# Patient Record
Sex: FEMALE | Race: WHITE | Employment: PART TIME | URBAN - METROPOLITAN AREA
[De-identification: names, ages, dates, MRNs, and addresses within clinical notes are randomized per-mention and may not be internally consistent; named-entity substitution may affect disease eponyms.]

---
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
## 2019-03-14 ENCOUNTER — HOSPITAL ENCOUNTER (EMERGENCY)
Facility: CLINIC | Age: 19
Discharge: HOME | End: 2019-03-14
Attending: PHYSICIAN ASSISTANT | Admitting: PHYSICIAN ASSISTANT
Payer: COMMERCIAL

## 2019-03-14 ENCOUNTER — APPOINTMENT (OUTPATIENT)
Dept: GENERAL RADIOLOGY | Facility: CLINIC | Age: 19
End: 2019-03-14
Attending: PHYSICIAN ASSISTANT
Payer: COMMERCIAL

## 2019-03-14 VITALS
SYSTOLIC BLOOD PRESSURE: 139 MMHG | TEMPERATURE: 98.3 F | HEART RATE: 99 BPM | RESPIRATION RATE: 16 BRPM | OXYGEN SATURATION: 99 % | DIASTOLIC BLOOD PRESSURE: 81 MMHG

## 2019-03-14 DIAGNOSIS — S62.662B OPEN NONDISPLACED FRACTURE OF DISTAL PHALANX OF RIGHT MIDDLE FINGER, INITIAL ENCOUNTER: ICD-10-CM

## 2019-03-14 DIAGNOSIS — S60.10XA SUBUNGUAL HEMATOMA OF DIGIT OF HAND, INITIAL ENCOUNTER: ICD-10-CM

## 2019-03-14 PROCEDURE — 26750 TREAT FINGER FRACTURE EACH: CPT

## 2019-03-14 PROCEDURE — G0463 HOSPITAL OUTPT CLINIC VISIT: HCPCS | Mod: 25

## 2019-03-14 PROCEDURE — 99213 OFFICE O/P EST LOW 20 MIN: CPT | Mod: 25 | Performed by: PHYSICIAN ASSISTANT

## 2019-03-14 PROCEDURE — 73140 X-RAY EXAM OF FINGER(S): CPT | Mod: RT

## 2019-03-14 PROCEDURE — 26750 TREAT FINGER FRACTURE EACH: CPT | Mod: F7 | Performed by: PHYSICIAN ASSISTANT

## 2019-03-14 RX ORDER — CEPHALEXIN 500 MG/1
500 CAPSULE ORAL 4 TIMES DAILY
Qty: 28 CAPSULE | Refills: 0 | Status: SHIPPED | OUTPATIENT
Start: 2019-03-14 | End: 2019-03-19

## 2019-03-14 NOTE — ED AVS SNAPSHOT
[facility] Emergency Department  [address] [facility] [address]  Phone:  [phone]  Fax:  [phone]                                    Idalia Alegria   MRN: [id number]    Department:  [facility] Emergency Department   Date of Visit:  3/14/2019           After Visit Summary Signature Page    I have received my discharge instructions, and my questions have been answered. I have discussed any challenges I see with this plan with the nurse or doctor.    ..........................................................................................................................................  Patient/Patient Representative Signature      ..........................................................................................................................................  Patient Representative Print Name and Relationship to Patient    ..................................................               ................................................  Date                                   Time    ..........................................................................................................................................  Reviewed by Signature/Title    ...................................................              ..............................................  Date                                               Time          22EPIC Rev 08/18

## 2019-03-14 NOTE — ED PROVIDER NOTES
History     Chief Complaint   Patient presents with     Hand Pain     smashed finger in car door on Monday     HPI  Idalia Alegria is a 18 year old female who presents to the urgent care with concern over right long finger pain after injury 3 days prior to arrival when she closed it in a car door.  She had immediate onset of ecchymosis, swelling.  She complains of mild to moderate pain which is exacerbated by palpation.  She has also noted decreased range of motion due to swelling.  No significant distal numbness or paresthesias.  She has not attempted any OTC treatments.  She is unsure the date of her last tetanus vaccine however epic records indicate she has received within the last ten years.      Allergies:  No Known Allergies    Problem List:    Patient Active Problem List    Diagnosis Date Noted     Pain of finger of right hand 08/14/2015     Priority: Medium     Acne vulgaris 08/14/2015     Priority: Medium     Periods, menstrual, difficult 08/14/2015     Priority: Medium        Past Medical History:    Past Medical History:   Diagnosis Date     Pneumonia        Past Surgical History:    No past surgical history on file.    Family History:    Family History   Problem Relation Age of Onset     C.A.D. No family hx of      Breast Cancer No family hx of      Cancer - colorectal No family hx of        Social History:  Marital Status:  Single [1]  Social History     Tobacco Use     Smoking status: Never Smoker     Smokeless tobacco: Never Used     Tobacco comment: no exposure   Substance Use Topics     Alcohol use: Yes     Comment: rarely     Drug use: No      Medications:      LESSINA-28 0.1-20 MG-MCG per tablet   venlafaxine (EFFEXOR-XR) 37.5 MG 24 hr capsule     Review of Systems  INTEGUMENTARY/SKIN: POSITIVE for ecchymosis, abrasion NEGATIVE for lacerations, worrisome rashes   MUSCULOSKELETAL: POSITIVE  for right long finger pain and swelling and NEGATIVE for other concerning arthralgias or myalgias   NEURO:  NEGATIVE for numbness, paresthesias   Physical Exam   BP: 139/81  Pulse: 99  Temp: 98.3  F (36.8  C)  Resp: 16  SpO2: 99 %  Physical Exam   Constitutional: She is oriented to person, place, and time. She appears well-developed and well-nourished. No distress.   Cardiovascular:   Pulses:       Radial pulses are 2+ on the right side.   Musculoskeletal:        Right wrist: Normal.        Right hand: She exhibits decreased range of motion (dip joint of long finger due to pain and swelling), tenderness and swelling. She exhibits normal capillary refill, no deformity and no laceration.   Large subungual hematoma that encompasses entire area of right long fingernail   Neurological: She is alert and oriented to person, place, and time. No sensory deficit.   Skin: Skin is warm and dry. Ecchymosis noted. No abrasion, no laceration and no rash noted. No erythema.     ED Course        Procedures        Critical Care time:  none          Results for orders placed or performed during the hospital encounter of 03/14/19   XR Finger Right G/E 2 Views    Narrative    RIGHT FINGER TWO OR MORE VIEWS  3/14/2019 6:22 PM     HISTORY: Pain after crush injury earlier this week.    COMPARISON: 8/14/2015.      Impression    IMPRESSION: There appears to be an oblique fracture through the distal  aspect of the third distal phalanx. This is only seen on the oblique  view. No other acute fractures are visualized. Bony alignment appears  to be within normal limits.     KAN MARTINEZ MD     Medications - No data to display    After discussing risk/benefits subungual hematoma was trephinated using electrocautery.    Assessments & Plan (with Medical Decision Making)     I have reviewed the nursing notes.    I have reviewed the findings, diagnosis, plan and need for follow up with the patient.          Medication List      Started    cephALEXin 500 MG capsule  Commonly known as:  KEFLEX  500 mg, Oral, 4 TIMES DAILY          Final diagnoses:   Open  nondisplaced fracture of distal phalanx of right middle finger, initial encounter   Subungual hematoma of digit of hand, initial encounter     18-year-old female presents to the urgent care with concern over right long finger pain after injury 4 days prior to arrival.  Show an oblique fracture of the distal phalanx.  As part of evaluation patient did have x-ray which did show oblique fracture of the distal phalanx.  After discussing risk/benefits subungual hematoma was trephinated using electrocautery, moderate amount of blood was obtained.  Patient tolerated procedure without any immediate complications.  Wound was dressed with Band-Aid.  Discussed that patient will likely lose her nail over the next 1-2 weeks.  She was discharged home stable with prescription for Keflex due to open fracture.  Aluminum foam split given for immobilization.  Follow up with PCP or ortho for recheck in the next 3-5 days. Wound care instructions, signs of infection, worrisome reasons to return discussed.      Disclaimer: This note consists of symbols derived from keyboarding, dictation, and/or voice recognition software. As a result, there may be errors in the script that have gone undetected.  Please consider this when interpreting information found in the chart.      3/14/2019   [facility] EMERGENCY DEPARTMENT     Shaina Luque PA-C  03/16/19 2141

## 2019-05-13 ENCOUNTER — OFFICE VISIT (OUTPATIENT)
Dept: OBGYN | Facility: CLINIC | Age: 19
End: 2019-05-13
Payer: COMMERCIAL

## 2019-05-13 VITALS
HEART RATE: 104 BPM | RESPIRATION RATE: 16 BRPM | BODY MASS INDEX: 31.28 KG/M2 | TEMPERATURE: 98.1 F | HEIGHT: 64 IN | WEIGHT: 183.2 LBS | DIASTOLIC BLOOD PRESSURE: 87 MMHG | SYSTOLIC BLOOD PRESSURE: 137 MMHG

## 2019-05-13 DIAGNOSIS — R10.2 PELVIC PAIN IN FEMALE: Primary | ICD-10-CM

## 2019-05-13 DIAGNOSIS — Z82.79: ICD-10-CM

## 2019-05-13 LAB
SPECIMEN SOURCE: ABNORMAL
WET PREP SPEC: ABNORMAL

## 2019-05-13 PROCEDURE — 87591 N.GONORRHOEAE DNA AMP PROB: CPT | Performed by: OBSTETRICS & GYNECOLOGY

## 2019-05-13 PROCEDURE — 87210 SMEAR WET MOUNT SALINE/INK: CPT | Performed by: OBSTETRICS & GYNECOLOGY

## 2019-05-13 PROCEDURE — 99204 OFFICE O/P NEW MOD 45 MIN: CPT | Performed by: OBSTETRICS & GYNECOLOGY

## 2019-05-13 PROCEDURE — 87491 CHLMYD TRACH DNA AMP PROBE: CPT | Performed by: OBSTETRICS & GYNECOLOGY

## 2019-05-13 ASSESSMENT — MIFFLIN-ST. JEOR: SCORE: 1578.05

## 2019-05-13 NOTE — NURSING NOTE
"Initial /87 (BP Location: Right arm, Patient Position: Sitting, Cuff Size: Adult Regular)   Pulse 104   Temp 98.1  F (36.7  C) (Tympanic)   Resp 16   Ht 1.613 m (5' 3.5\")   Wt 83.1 kg (183 lb 3.2 oz)   LMP 04/25/2019   Breastfeeding? No   BMI 31.94 kg/m   Estimated body mass index is 31.94 kg/m  as calculated from the following:    Height as of this encounter: 1.613 m (5' 3.5\").    Weight as of this encounter: 83.1 kg (183 lb 3.2 oz). .    Meme Vidal, [facility]    "

## 2019-05-13 NOTE — PROGRESS NOTES
"  SUBJECTIVE:                                                   CC:  Patient presents with:  Consult: Irregular periods, painful cramping, painful intercourse, birth control pill      HPI:  Idalia Alegria is a 19 year old  who presents for painful periods, dysmenorrhea, and irregular periods.      Periods are heavy and crampy, somewhat irregular even on OCP  Sex is painful - bad at the beginning and also many times needs to stop because it is excruciating  The pain is lower abdominal, probably worse with periods but can be there throughout the cycle  Going on x2 years   On OCP, switched 2 years ago when this all started.  Sometimes forgets but then takes it the next day when she notices it was missed.  Not planning on having kids anytime soon  No discharge  Worse with BM and urination  Aunts on dad's side with hysterectomies in their 30s - one or two with bicornuate uterus  Never had a pelvic exam    ROS: 10 point ROS negative other than as listed above in HPI.    Gyn History:  Patient's last menstrual period was 2019.  No obstetric history on file.   Patient is sexually active.  Using oral contraceptives for contraception.     PMH, PSH, Soc Hx, Fam Hx, Meds, and allergies reviewed in Epic.    OBJECTIVE:     /87 (BP Location: Right arm, Patient Position: Sitting, Cuff Size: Adult Regular)   Pulse 104   Temp 98.1  F (36.7  C) (Tympanic)   Resp 16   Ht 1.613 m (5' 3.5\")   Wt 83.1 kg (183 lb 3.2 oz)   LMP 2019   Breastfeeding? No   BMI 31.94 kg/m      Gen: Healthy appearing obese female, no acute distress, comfortable - after exam is diaphoretic and visibly uncomfortable  HENT: No scleral injection or icterus  CV: mild tachycardia, anxious  Resp: Normal work of breathing, no cough  GI: Abdomen soft, non-tender. No masses, organomegaly  Skin: No suspicious lesions or rashes - acne present  Psychiatric: mentation appears normal and affect bright  : Normal external female genitalia.  No " external lesions, no hair  SSE: Speculum exam is painful at the external pelvic floor muscles, and reveals vaginal epithelium well rugated with normal physiologic slightly malodorous discharge. Cervix appears smooth, pink, with no visible lesions.  There is only one cervix  Bimanual exam reveals normal size uterus, non-tender, and mobile. No adnexal masses or tenderness. No cervical motion tenderness.   Severe tenderness with palpation of the posterior pelvic floor muscles extending all the way out nearly to the perineum  No tenderness with palpation of the urethra      ASSESSMENT/PLAN:                                                      1. Pelvic pain in female  Discussed etiologies for pelvic pain including endometriosis, high tone pelvic floor dysfunction, ovarian cysts, dysmenorrhea, constipation, infection.  Recommend ruling out infection, consider pelvic floor PT.  We discussed treatment of endometriosis and briefly also discussed surgical diagnosis and mgmt of endometriosis.  We also discussed menstrual suppression with OCP, nexplanon, or IUD.  She desires the IUD but we will obtain imaging of the uterus first due to fam h/o uterine anomaly as well as pelvic pain.  - PHYSICAL THERAPY REFERRAL; Future  - Wet prep  - Neisseria gonorrhoeae PCR  - Chlamydia trachomatis PCR    2. Family history of uterine anomaly  - US Pelvic Complete w Transvaginal; Future     Dayanara Ospina MD, MPH  Obstetrics and Gynecology

## 2019-05-13 NOTE — RESULT ENCOUNTER NOTE
Please call the patient with the results.     Looks like there could be a yeast and a bacterial infection.  It's probably not what's causing the primary pain, but could be contributing.    Please call in diflucan 150mg po x1 or recommend OTC 3 day monistat  Please call in treatment for BV (flagyl 500mg BID x7d or metrogel 0.75% at bedtime x5d) to the pharmacy of her choice.  If she uses oral medication please remind her not to drink alcohol because it can cause upset stomach when used together.     Dayanara Ospina MD

## 2019-05-15 ENCOUNTER — HOSPITAL ENCOUNTER (OUTPATIENT)
Dept: ULTRASOUND IMAGING | Facility: CLINIC | Age: 19
Discharge: HOME | End: 2019-05-15
Attending: OBSTETRICS & GYNECOLOGY | Admitting: OBSTETRICS & GYNECOLOGY
Payer: COMMERCIAL

## 2019-05-15 DIAGNOSIS — Z82.79: ICD-10-CM

## 2019-05-15 PROCEDURE — 76830 TRANSVAGINAL US NON-OB: CPT

## 2019-05-16 DIAGNOSIS — N76.0 BACTERIAL VAGINOSIS: Primary | ICD-10-CM

## 2019-05-16 DIAGNOSIS — B96.89 BACTERIAL VAGINOSIS: Primary | ICD-10-CM

## 2019-05-16 RX ORDER — METRONIDAZOLE 500 MG/1
500 TABLET ORAL 2 TIMES DAILY
Qty: 14 TABLET | Refills: 0 | Status: SHIPPED | OUTPATIENT
Start: 2019-05-16 | End: 2019-05-23

## 2019-06-17 ENCOUNTER — OFFICE VISIT (OUTPATIENT)
Dept: OBGYN | Facility: CLINIC | Age: 19
End: 2019-06-17
Payer: COMMERCIAL

## 2019-06-17 VITALS
SYSTOLIC BLOOD PRESSURE: 129 MMHG | TEMPERATURE: 97.2 F | HEIGHT: 64 IN | HEART RATE: 89 BPM | BODY MASS INDEX: 31.18 KG/M2 | RESPIRATION RATE: 16 BRPM | DIASTOLIC BLOOD PRESSURE: 88 MMHG | WEIGHT: 182.6 LBS

## 2019-06-17 DIAGNOSIS — Z30.430 ENCOUNTER FOR INSERTION OF INTRAUTERINE CONTRACEPTIVE DEVICE: Primary | ICD-10-CM

## 2019-06-17 LAB — HCG UR QL: NEGATIVE

## 2019-06-17 PROCEDURE — 58300 INSERT INTRAUTERINE DEVICE: CPT | Performed by: OBSTETRICS & GYNECOLOGY

## 2019-06-17 PROCEDURE — 81025 URINE PREGNANCY TEST: CPT | Performed by: OBSTETRICS & GYNECOLOGY

## 2019-06-17 ASSESSMENT — MIFFLIN-ST. JEOR: SCORE: 1575.33

## 2019-06-17 NOTE — NURSING NOTE
"Initial /88 (BP Location: Left arm, Patient Position: Chair, Cuff Size: Adult Regular)   Pulse 89   Temp 97.2  F (36.2  C) (Tympanic)   Resp 16   Ht 1.613 m (5' 3.5\")   Wt 82.8 kg (182 lb 9.6 oz)   LMP 05/22/2019   Breastfeeding? No   BMI 31.84 kg/m   Estimated body mass index is 31.84 kg/m  as calculated from the following:    Height as of this encounter: 1.613 m (5' 3.5\").    Weight as of this encounter: 82.8 kg (182 lb 9.6 oz). .    Ignacia Willis, MAGGY    "

## 2019-06-17 NOTE — PROGRESS NOTES
"Encounter for IUD insertion     CC:  IUD insertion  HPI:  Idalia Alegria is a 19 year old female G0 who presents for an IUD insertion.  She was seen on 5/13/2019 for irregular periods and dysmenorrhea by Dr. Genoveva Ospina.   She was counseled at that visit and patient elected to proceed with hormonal IUD insertion.   She is not sure which kind of hormonal IUD she wants to choose and wants to hear about her options in regards to that.     Past GYN history:  No STD history         Manogomous relationship? Yes    Reviewed with patient in office    Allergies: Patient has no known allergies.    Current Outpatient Medications   Medication Sig Dispense Refill     LESSINA-28 0.1-20 MG-MCG per tablet Take 1 tablet by mouth daily 84 tablet 3     venlafaxine (EFFEXOR-XR) 37.5 MG 24 hr capsule 37.5 mg every other day Weaning off         ROS:  C: NEGATIVE for fever, chills, change in weight  R: NEGATIVE for significant cough or SOB  CV: NEGATIVE for chest pain, palpitations or peripheral edema  GI: NEGATIVE for nausea, abdominal pain, heartburn, or change in bowel habits  : NEGATIVE for frequency, dysuria, hematuria, vaginal discharge, or irregular bleeding    EXAM:  Blood pressure 129/88, pulse 89, temperature 97.2  F (36.2  C), temperature source Tympanic, resp. rate 16, height 1.613 m (5' 3.5\"), weight 82.8 kg (182 lb 9.6 oz), last menstrual period 05/22/2019, not currently breastfeeding.  General - pleasant female in no acute distress.  Pelvic - EG: normal adult female, BUS: within normal limits, Vagina: well rugated, no discharge, Cervix: no lesions or CMT, Uterus: firm, normal sized and nontender, Adnexae: no masses or tenderness.    PROCEDURE:  After informed consent was obtained from the patient, a speculum was placed in the vagina to visualized the cervix.  The cervix was then swabbed with a betadine prep and 1% lidocaine paracervical block applied.  Tenaculum was placed at the 12 o'clock position on the cervix and the " uterus sounded to 7 cm.  The Mirena  IUD was then placed in the usual fashion under sterile technique. Bedside abdominal ultrasound was used to facilitate insertion of IUD.  Strings were clipped about 2 cm from the cervical os.  Tenaculum was removed and cervix was hemostatic. There were no complications. The patient tolerated the procedure well.    NDC#:81667-898-69 (MIRENA)    ASSESSMENT/PLAN:  Encounter for IUD insertion  -- counseled on hormonal IUD options which included Mirena and Grisel IUD with associated benefits in regards to likelihood of amenorrhea, duration of action for both IUD options, size of IUDs in relation to likelihood pelvic pain from IUD in place; ultimately patient elected for Mirena IUD insertion  The patient should feel for the IUD strings after her next menses.  If unable to locate them, she should return to clinic for a speculum examination for confirmation that the IUD is in place. Bleeding pattern of this particular IUD was discussed with the patient. She is aware that the IUD will need to be removed in 5 years or PRN.  She is to return to clinic for her next annual or PRN.    Faby Torres MD  [facility]

## 2019-11-03 ENCOUNTER — HEALTH MAINTENANCE LETTER (OUTPATIENT)
Age: 19
End: 2019-11-03

## 2020-01-27 ENCOUNTER — OFFICE VISIT (OUTPATIENT)
Dept: OBGYN | Facility: CLINIC | Age: 20
End: 2020-01-27
Payer: COMMERCIAL

## 2020-01-27 VITALS
SYSTOLIC BLOOD PRESSURE: 135 MMHG | TEMPERATURE: 97.4 F | BODY MASS INDEX: 31.92 KG/M2 | RESPIRATION RATE: 18 BRPM | WEIGHT: 187 LBS | HEIGHT: 64 IN | HEART RATE: 84 BPM | DIASTOLIC BLOOD PRESSURE: 75 MMHG

## 2020-01-27 DIAGNOSIS — Z30.431 IUD CHECK UP: Primary | ICD-10-CM

## 2020-01-27 PROCEDURE — 99213 OFFICE O/P EST LOW 20 MIN: CPT | Performed by: ADVANCED PRACTICE MIDWIFE

## 2020-01-27 ASSESSMENT — MIFFLIN-ST. JEOR: SCORE: 1595.29

## 2020-01-27 NOTE — NURSING NOTE
"Initial /75 (BP Location: Right arm, Patient Position: Chair, Cuff Size: Adult Large)   Pulse 84   Temp 97.4  F (36.3  C) (Tympanic)   Resp 18   Ht 1.613 m (5' 3.5\")   Wt 84.8 kg (187 lb)   LMP 01/16/2020   BMI 32.61 kg/m   Estimated body mass index is 32.61 kg/m  as calculated from the following:    Height as of this encounter: 1.613 m (5' 3.5\").    Weight as of this encounter: 84.8 kg (187 lb). .      "

## 2020-01-27 NOTE — LETTER
January 27, 2020      Idalia Alegria  [address] [place] [address]        To Whom It May Concern:    Idalia Alegria  was seen on 1/27/2020.  Please excuse her from school.        Sincerely,        Patricia Guaman CNM

## 2020-01-27 NOTE — PROGRESS NOTES
"S:  Here for an IUD check.  She had an IUD placed in June due to painful periods.  She has had no problems until this week where she has had irregular bleeding on and off and occasional pelvic pain.  She reports her pain and bleeding is \"much better\" with the IUD in place.  She has not had the IUD checked or checked the string herself.  She is accompanied by her fiance.    O:  Appears well, in no distress  Normal pelvic exam - no pain or masses during exam  IUD string palpated at the cervical os  Speculum placed and IUD string visualized   No bleeding noted during exam  A:  IUD check   P:  Reassured her the the IUD is in place  Discussed the irregular bleeding with the IUD is normal  She can taking ibuprofen for discomfort and/or bleeding  If the bleeding continue for over 1-2 months and is very bothersome, we can consider OCPs in addition to the IUD.   Return to clinic after age 20 for a physical and pap  She verbalized understanding and agreement with plan      20 minutes was spent face to face with the patient today discussing her history, diagnosis, and follow-up plan as noted above. Over 50% of the visit was spent in counseling and coordination of care.  Total Visit Time: 20 minutes.       "

## 2020-02-10 ENCOUNTER — HEALTH MAINTENANCE LETTER (OUTPATIENT)
Age: 20
End: 2020-02-10

## 2020-11-16 ENCOUNTER — HEALTH MAINTENANCE LETTER (OUTPATIENT)
Age: 20
End: 2020-11-16

## 2021-04-03 ENCOUNTER — HEALTH MAINTENANCE LETTER (OUTPATIENT)
Age: 21
End: 2021-04-03

## 2021-08-26 ENCOUNTER — LAB (OUTPATIENT)
Dept: FAMILY MEDICINE | Facility: CLINIC | Age: 21
End: 2021-08-26
Attending: PHYSICIAN ASSISTANT
Payer: COMMERCIAL

## 2021-08-26 ENCOUNTER — E-VISIT (OUTPATIENT)
Dept: URGENT CARE | Facility: URGENT CARE | Age: 21
End: 2021-08-26
Payer: COMMERCIAL

## 2021-08-26 DIAGNOSIS — Z20.822 SUSPECTED COVID-19 VIRUS INFECTION: Primary | ICD-10-CM

## 2021-08-26 DIAGNOSIS — Z20.822 SUSPECTED COVID-19 VIRUS INFECTION: ICD-10-CM

## 2021-08-26 PROCEDURE — U0003 INFECTIOUS AGENT DETECTION BY NUCLEIC ACID (DNA OR RNA); SEVERE ACUTE RESPIRATORY SYNDROME CORONAVIRUS 2 (SARS-COV-2) (CORONAVIRUS DISEASE [COVID-19]), AMPLIFIED PROBE TECHNIQUE, MAKING USE OF HIGH THROUGHPUT TECHNOLOGIES AS DESCRIBED BY CMS-2020-01-R: HCPCS

## 2021-08-26 PROCEDURE — U0005 INFEC AGEN DETEC AMPLI PROBE: HCPCS

## 2021-08-26 PROCEDURE — 99421 OL DIG E/M SVC 5-10 MIN: CPT | Performed by: PHYSICIAN ASSISTANT

## 2021-08-26 NOTE — PATIENT INSTRUCTIONS
Dear Idalia Alegria,    Your symptoms show that you may have coronavirus (COVID-19). This illness can cause fever, cough and trouble breathing. Many people get a mild case and get better on their own. Some people can get very sick.    Will I be tested for COVID-19?  We would like to test you for Covid-19 virus. I have placed orders for this test.     To schedule: go to your [facility] home page and scroll down to the section that says  You have an appointment that needs to be scheduled  and click the large green button that says  Schedule Now  and follow the steps to find the next available openings.    If you are unable to complete these [facility] scheduling steps, please call [phone] to schedule your testing.     Return to work/school/ guidance:  Please let your workplace manager and staffing office know when your quarantine ends     We can t give you an exact date as it depends on the above. You can calculate this on your own or work with your manager/staffing office to calculate this. (For example if you were exposed on 10/4, you would have to quarantine for 14 full days. That would be through 10/18. You could return on 10/19.)      If you receive a positive COVID-19 test result, follow the guidance of the those who are giving you the results. Usually the return to work is 10 (or in some cases 20 days from symptom onset.) If you work at [employer], you must also be cleared by Employee Occupational Health and Safety to return to work.        If you receive a negative COVID-19 test result and did not have a high risk exposure to someone with a known positive COVID-19 test, you can return to work once you're free of fever for 24 hours without fever-reducing medication and your symptoms are improving or resolved.      If you receive a negative COVID-19 test and If you had a high risk exposure to someone who has tested positive for COVID-19 then you can return to work 14 days after your last contact  with the positive individual    Note: If you have ongoing exposure to the covid positive person, this quarantine period may be more than 14 days. (For example, if you are continued to be exposed to your child who tested positive and cannot isolate from them, then the quarantine of 7-14 days can't start until your child is no longer contagious. This is typically 10 days from onset of the child's symptoms. So the total duration may be 17-24 days in this case.)    Sign up for [employer].   We know it's scary to hear that you might have COVID-19. We want to track your symptoms to make sure you're okay over the next 2 weeks. Please look for an email from [employer]--this is a free, online program that we'll use to keep in touch. To sign up, follow the link in the email you will receive. Learn more at [email or web address]    How can I take care of myself?    Get lots of rest. Drink extra fluids (unless a doctor has told you not to)    Take Tylenol (acetaminophen) or ibuprofen for fever or pain. If you have liver or kidney problems, ask your family doctor if it's okay to take Tylenol o ibuprofen    If you have other health problems (like cancer, heart failure, an organ transplant or severe kidney disease): Call your specialty clinic if you don't feel better in the next 2 days.    Know when to call 911. Emergency warning signs include:  o Trouble breathing or shortness of breath  o Pain or pressure in the chest that doesn't go away  o Feeling confused like you haven't felt before, or not being able to wake up  o Bluish-colored lips or face    Where can I get more information?  [facility] - About COVID-19:   [email or web address]    CDC - What to Do If You're Sick:   www.cdc.gov/coronavirus/2019-ncov/about/steps-when-sick.html    August 26, 2021  RE:  Idalia Alegria                                                                                                                  [address]  [place] [address]      To whom it may concern:    I evaluated Idalia Alegria on August 26, 2021. Idalia Alegria should be excused from work/school.     They should let their workplace manager and staffing office know when their quarantine ends.    We can not give an exact date as it depends on the information below. They can calculate this on their own or work with their manager/staffing office to calculate this. (For example if they were exposed on 10/04, they would have to quarantine for 14 full days. That would be through 10/18. They could return on 10/19.)    Quarantine Guidelines:      If patient receives a positive COVID-19 test result, they should follow the guidance of those who are giving the results. Usually the return to work is 10 (or in some cases 20 days from symptom onset.) If they work at [employer], they must be cleared by Employee Occupational Health and Safety to return to work.        If patient receives a negative COVID-19 test result and did not have a high risk exposure to someone with a known positive COVID-19 test, they can return to work once they're free of fever for 24 hours without fever-reducing medication and their symptoms are improving or resolved.      If patient receives a negative COVID-19 test and if they had a high risk exposure to someone who has tested positive for COVID-19 then they can return to work 14 days after their last contact with the positive individual    Note: If there is ongoing exposure to the covid positive person, this quarantine period may be longer than 14 days. (For example, if they are continually exposed to their child, who tested positive and cannot isolate from them, then the quarantine of 7-14 days can't start until their child is no longer contagious. This is typically 10 days from onset to the child's symptoms. So the total duration may be 17-24 days in this case.)     Sincerely,  Heather Shepherd PA-C

## 2021-08-27 LAB — SARS-COV-2 RNA RESP QL NAA+PROBE: POSITIVE

## 2021-09-18 ENCOUNTER — HEALTH MAINTENANCE LETTER (OUTPATIENT)
Age: 21
End: 2021-09-18

## 2022-04-30 ENCOUNTER — HEALTH MAINTENANCE LETTER (OUTPATIENT)
Age: 22
End: 2022-04-30

## 2022-11-19 ENCOUNTER — HEALTH MAINTENANCE LETTER (OUTPATIENT)
Age: 22
End: 2022-11-19

## 2023-06-01 ENCOUNTER — HEALTH MAINTENANCE LETTER (OUTPATIENT)
Age: 23
End: 2023-06-01

## 2024-06-16 ENCOUNTER — HEALTH MAINTENANCE LETTER (OUTPATIENT)
Age: 24
End: 2024-06-16